# Patient Record
Sex: FEMALE | Race: WHITE | NOT HISPANIC OR LATINO | URBAN - METROPOLITAN AREA
[De-identification: names, ages, dates, MRNs, and addresses within clinical notes are randomized per-mention and may not be internally consistent; named-entity substitution may affect disease eponyms.]

---

## 2017-04-24 ENCOUNTER — IMPORTED ENCOUNTER (OUTPATIENT)
Dept: URBAN - METROPOLITAN AREA CLINIC 1 | Facility: CLINIC | Age: 52
End: 2017-04-24

## 2017-04-24 PROBLEM — H16.143: Noted: 2017-04-24

## 2017-04-24 PROBLEM — H25.13: Noted: 2017-04-24

## 2017-04-24 PROBLEM — H04.123: Noted: 2017-04-24

## 2017-04-24 PROCEDURE — 92012 INTRM OPH EXAM EST PATIENT: CPT

## 2017-04-24 NOTE — PATIENT DISCUSSION
1.  JESSE now w/ PEK OU- Slight progression. The increase of artificial tears OU TID were recommended. Continue Xiidra OU BID. Plugs intact Ll's OU. (Unable to afford Restasis.) 2. Cataract OU: Observe for now without intervention. The patient was advised to contact us if any change or worsening of vision3. Return for an appointment for a 30cc in 6 months with Dr. Jorge Isidro.

## 2017-12-18 ENCOUNTER — IMPORTED ENCOUNTER (OUTPATIENT)
Dept: URBAN - METROPOLITAN AREA CLINIC 1 | Facility: CLINIC | Age: 52
End: 2017-12-18

## 2017-12-18 PROBLEM — H04.123: Noted: 2017-12-18

## 2017-12-18 PROBLEM — H25.13: Noted: 2017-12-18

## 2017-12-18 PROBLEM — H16.143: Noted: 2017-12-18

## 2017-12-18 PROCEDURE — 92014 COMPRE OPH EXAM EST PT 1/>: CPT

## 2017-12-18 PROCEDURE — 92015 DETERMINE REFRACTIVE STATE: CPT

## 2017-12-18 NOTE — PATIENT DISCUSSION
1.  JESSE w/ PEK OU- Stable. Continue Xiidra OU BID. The continuation of artificial tears OU BID were recommended. Plugs intact LL's OU.2. Cataract OU: Observe for now without intervention. The patient was advised to contact us if any change or worsening of vision3. Return for an appointment for a 10 in 6 months with Dr. Hayden Smith.

## 2018-06-18 ENCOUNTER — IMPORTED ENCOUNTER (OUTPATIENT)
Dept: URBAN - METROPOLITAN AREA CLINIC 1 | Facility: CLINIC | Age: 53
End: 2018-06-18

## 2018-06-18 PROBLEM — H04.123: Noted: 2018-06-18

## 2018-06-18 PROBLEM — H25.13: Noted: 2018-06-18

## 2018-06-18 PROBLEM — H16.143: Noted: 2018-06-18

## 2018-06-18 PROCEDURE — 92012 INTRM OPH EXAM EST PATIENT: CPT

## 2018-06-18 NOTE — PATIENT DISCUSSION
1.  JESSE w/ PEK OU-The continuation of artificial tears were recommended. Patient d/c Nghial Shauna secondary to insurance not covering. 2. Cataract OU: Observe for now without intervention. The patient was advised to contact us if any change or worsening of vision3. Return for an appointment for Jan/30 with Dr. Barbara Diaz.

## 2019-01-14 ENCOUNTER — IMPORTED ENCOUNTER (OUTPATIENT)
Dept: URBAN - METROPOLITAN AREA CLINIC 1 | Facility: CLINIC | Age: 54
End: 2019-01-14

## 2019-01-14 PROBLEM — H25.13: Noted: 2019-01-14

## 2019-01-14 PROBLEM — H04.123: Noted: 2019-01-14

## 2019-01-14 PROBLEM — H16.143: Noted: 2019-01-14

## 2019-01-14 PROCEDURE — 92015 DETERMINE REFRACTIVE STATE: CPT

## 2019-01-14 PROCEDURE — 92014 COMPRE OPH EXAM EST PT 1/>: CPT

## 2019-01-14 NOTE — PATIENT DISCUSSION
1.  JESSE w/ PEK OU- (LL plugs in place OU) Recommend ATs QID OU routinely and AT Brad/Gel QHS OU. May use Celluvisc QD-BDI OU. Consider generic Restasis. 2.  Cataract OU: Observe for now without intervention. The patient was advised to contact us if any change or worsening of visionMRX for glasses given and finalized CTL RXReturn for an appointment in 6 months 10 with Dr. Billy Max.

## 2019-08-02 ENCOUNTER — IMPORTED ENCOUNTER (OUTPATIENT)
Dept: URBAN - METROPOLITAN AREA CLINIC 1 | Facility: CLINIC | Age: 54
End: 2019-08-02

## 2019-08-02 PROBLEM — H16.143: Noted: 2019-08-02

## 2019-08-02 PROBLEM — H25.13: Noted: 2019-08-02

## 2019-08-02 PROBLEM — H04.123: Noted: 2019-08-02

## 2019-08-02 PROCEDURE — 92012 INTRM OPH EXAM EST PATIENT: CPT

## 2019-08-02 PROCEDURE — 83861 MICROFLUID ANALY TEARS: CPT

## 2020-01-13 ENCOUNTER — IMPORTED ENCOUNTER (OUTPATIENT)
Dept: URBAN - METROPOLITAN AREA CLINIC 1 | Facility: CLINIC | Age: 55
End: 2020-01-13

## 2020-01-13 PROBLEM — H04.123: Noted: 2020-01-13

## 2020-01-13 PROBLEM — H25.13: Noted: 2020-01-13

## 2020-01-13 PROBLEM — H16.143: Noted: 2020-01-13

## 2020-01-13 PROBLEM — H25.813: Noted: 2020-01-13

## 2020-01-13 PROCEDURE — 92014 COMPRE OPH EXAM EST PT 1/>: CPT

## 2020-01-13 PROCEDURE — 92015 DETERMINE REFRACTIVE STATE: CPT

## 2020-01-13 NOTE — PATIENT DISCUSSION
1.  JESSE w/ PEK OU -- (LL plug in place OU) Tear Lab results 298/292 (08/02/19). Continue Xiidra BID OU (Erx'd). Recommend ATs QID OU routinely and AT Brad QHS OU.2. Cataract OU -- Observe for now without intervention. The patient was advised to contact us if any change or worsening of visionFinalized Glasses MRx and CTL Rx today. Return for an appointment in 6 months for a 10 with Dr. Claudeen Cobble.

## 2021-03-04 ENCOUNTER — IMPORTED ENCOUNTER (OUTPATIENT)
Dept: URBAN - METROPOLITAN AREA CLINIC 1 | Facility: CLINIC | Age: 56
End: 2021-03-04

## 2021-03-04 PROBLEM — H52.223: Noted: 2021-03-04

## 2021-03-04 PROBLEM — H44.23: Noted: 2021-03-04

## 2021-03-04 PROBLEM — H52.4: Noted: 2021-03-04

## 2021-03-04 PROCEDURE — S0621 ROUTINE OPHTHALMOLOGICAL EXA: HCPCS

## 2021-03-04 NOTE — PATIENT DISCUSSION
1. Myopia- Rx was given for correction if indicated and requested. 2. Astigmatism OU3. Presbyopia4. Cataract OU- Observe5. JESSE w/ PEK OU- (LL plug in place OU) Continue Xiidra BID OU. Recommend ATs QID OU routinely and AT Brad QHS OU. CTL Trials to be ordered by Philip Nunez for an appointment in 1 wk CC with Dr. Kari Velásquez. Return for an appointment for Return as scheduled 05/13/2021 with Dr. Ricardo Cohen.

## 2021-04-05 NOTE — PATIENT DISCUSSION
1.  JESSE w/ PEK OU - (LL plug in place OU) Tear Lab results 298/292. Begin Xiidra BID OU (erx) (has used Keisha Solo in the past with good result). Recommend ATs QID OU routinely and AT Rbad QHS OU 2. Cataract OU: Observe for now without intervention. The patient was advised to contact us if any change or worsening of visionReturn for an appointment in January 30/cc with Dr. Mackenzie Villalobos. Dr. Will examining patient.

## 2021-04-08 ENCOUNTER — IMPORTED ENCOUNTER (OUTPATIENT)
Dept: URBAN - METROPOLITAN AREA CLINIC 1 | Facility: CLINIC | Age: 56
End: 2021-04-08

## 2021-04-08 NOTE — PATIENT DISCUSSION
1.  CC today - Good fit [employer] OU. [address] and given to pt. Return for an appointment in 1 year 40/cc with Dr. Mikala Elena. Return for an appointment for Return as scheduled with Dr. Samira Wyatt.

## 2021-05-13 ENCOUNTER — IMPORTED ENCOUNTER (OUTPATIENT)
Dept: URBAN - METROPOLITAN AREA CLINIC 1 | Facility: CLINIC | Age: 56
End: 2021-05-13

## 2021-05-13 PROBLEM — H16.143: Noted: 2021-05-13

## 2021-05-13 PROBLEM — H25.813: Noted: 2021-05-13

## 2021-05-13 PROBLEM — H04.123: Noted: 2021-05-13

## 2021-05-13 PROCEDURE — 99214 OFFICE O/P EST MOD 30 MIN: CPT

## 2021-05-13 NOTE — PATIENT DISCUSSION
1.  JESSE w/ PEK OU -- Recommend compliance with ATs QID OU routinely. Cont Xiidra BID OU. 2.  Cataract OU -- Observe for now without intervention. The patient was advised to contact us if any change or worsening of vision3. Pinguecula OS -- Observe. Sunglasses when exposed to UV is recommended. Return for an appointment in 6 months for a 10 with Dr. Edd Guidry.

## 2021-11-15 ENCOUNTER — IMPORTED ENCOUNTER (OUTPATIENT)
Dept: URBAN - METROPOLITAN AREA CLINIC 1 | Facility: CLINIC | Age: 56
End: 2021-11-15

## 2021-11-15 PROBLEM — H16.143: Noted: 2021-11-15

## 2021-11-15 PROBLEM — H04.123: Noted: 2021-11-15

## 2021-11-15 PROCEDURE — 99213 OFFICE O/P EST LOW 20 MIN: CPT

## 2021-11-15 NOTE — PATIENT DISCUSSION
1.  JESSE w/ PEK OU (LL Plug in place OU) -- Cont Xiidra BID OU & ATs QID OU routinely. 2.  Cataract OU -- Observe for now without intervention. The patient was advised to contact us if any change or worsening of vision3. Pinguecula OS -- Observe. Sunglasses when exposed to UV is recommended. 4.  CTL wear Return for an appointment as scheduled with Dr. Danuta Domínguez. Return for an appointment in 6 months 27 with Dr. Nallely Jane.

## 2022-04-02 ASSESSMENT — TONOMETRY
OD_IOP_MMHG: 15
OD_IOP_MMHG: 14
OS_IOP_MMHG: 16
OS_IOP_MMHG: 12
OS_IOP_MMHG: 16
OS_IOP_MMHG: 15
OD_IOP_MMHG: 12
OS_IOP_MMHG: 14
OS_IOP_MMHG: 14
OD_IOP_MMHG: 15
OS_IOP_MMHG: 14
OD_IOP_MMHG: 16
OS_IOP_MMHG: 14
OD_IOP_MMHG: 15
OD_IOP_MMHG: 16
OD_IOP_MMHG: 14
OD_IOP_MMHG: 14
OS_IOP_MMHG: 15

## 2022-04-02 ASSESSMENT — KERATOMETRY
OD_K2POWER_DIOPTERS: 45.00
OS_K1POWER_DIOPTERS: 44.50
OD_K1POWER_DIOPTERS: 44.00
OS_K2POWER_DIOPTERS: 44.75
OD_AXISANGLE2_DEGREES: 073
OD_AXISANGLE2_DEGREES: 073
OD_K2POWER_DIOPTERS: 45.50
OS_AXISANGLE_DEGREES: 013
OS_AXISANGLE_DEGREES: 004
OS_AXISANGLE2_DEGREES: 103
OD_K1POWER_DIOPTERS: 44.25
OS_K1POWER_DIOPTERS: 44.50
OD_AXISANGLE_DEGREES: 163
OD_AXISANGLE_DEGREES: 163
OS_K2POWER_DIOPTERS: 45.00
OS_AXISANGLE2_DEGREES: 094

## 2022-04-02 ASSESSMENT — VISUAL ACUITY
OS_SC: 20/20
OD_SC: 20/20
OD_GLARE: 20/40
OS_CC: J1+
OS_SC: 20/20
OD_SC: 20/20
OD_SC: 20/20
OS_SC: 20/20
OS_SC: 20/20
OD_SC: 20/20
OS_CC: J1
OD_SC: 20/20
OS_SC: 20/20
OS_CC: J1
OD_SC: 20/20
OD_SC: 20/20
OS_SC: 20/20
OS_SC: 20/20
OD_CC: J1
OS_SC: 20/20
OD_SC: 20/20
OD_SC: 20/20
OD_CC: J1
OD_SC: 20/20
OD_CC: J1+
OS_SC: 20/20
OS_GLARE: 20/40
OS_SC: 20/20

## 2022-04-25 ENCOUNTER — COMPREHENSIVE EXAM (OUTPATIENT)
Dept: URBAN - METROPOLITAN AREA CLINIC 1 | Facility: CLINIC | Age: 57
End: 2022-04-25

## 2022-04-25 DIAGNOSIS — H52.223: ICD-10-CM

## 2022-04-25 DIAGNOSIS — H52.13: ICD-10-CM

## 2022-04-25 PROCEDURE — S0621 ROUTINE OPHTHALMOLOGICAL EXA: HCPCS

## 2022-04-25 PROCEDURE — 92310 CONTACT LENS FITTING OU: CPT

## 2022-04-25 ASSESSMENT — VISUAL ACUITY
OS_CC: 20/20 -1
OD_CC: 20/20 -1
OS_CC: J1+
OD_CC: J1+

## 2022-04-25 ASSESSMENT — TONOMETRY
OS_IOP_MMHG: 14
OD_IOP_MMHG: 14

## 2022-04-25 NOTE — PATIENT DISCUSSION
Patient given Rx for glasses and trials for MF contacts (Ultra Toric). Advised patient to return for CC, if patient tries lenses for a little bit before next appointment and does not like them, per RBF finalized old CTL Rx as back up plan. OK to release if patient calls and dislikes MF CTL. Previous lenses (Precision 1 Toric/Precision 1).

## 2022-07-11 ENCOUNTER — COMPREHENSIVE EXAM (OUTPATIENT)
Dept: URBAN - METROPOLITAN AREA CLINIC 1 | Facility: CLINIC | Age: 57
End: 2022-07-11

## 2022-07-11 DIAGNOSIS — H04.123: ICD-10-CM

## 2022-07-11 DIAGNOSIS — H11.152: ICD-10-CM

## 2022-07-11 DIAGNOSIS — H25.13: ICD-10-CM

## 2022-07-11 DIAGNOSIS — H16.143: ICD-10-CM

## 2022-07-11 PROCEDURE — 92014 COMPRE OPH EXAM EST PT 1/>: CPT

## 2022-07-11 ASSESSMENT — TONOMETRY
OD_IOP_MMHG: 14
OS_IOP_MMHG: 14

## 2022-07-11 ASSESSMENT — VISUAL ACUITY
OD_CC: 20/20
OS_CC: 20/20

## 2023-01-23 ENCOUNTER — FOLLOW UP (OUTPATIENT)
Dept: URBAN - METROPOLITAN AREA CLINIC 1 | Facility: CLINIC | Age: 58
End: 2023-01-23

## 2023-01-23 DIAGNOSIS — H16.143: ICD-10-CM

## 2023-01-23 DIAGNOSIS — H04.123: ICD-10-CM

## 2023-01-23 PROCEDURE — 92012 INTRM OPH EXAM EST PATIENT: CPT

## 2023-01-23 ASSESSMENT — TONOMETRY
OS_IOP_MMHG: 13
OD_IOP_MMHG: 13

## 2023-01-23 ASSESSMENT — VISUAL ACUITY
OD_CC: 20/20
OS_CC: J1
OD_CC: J1
OS_CC: 20/20

## 2023-01-23 NOTE — PATIENT DISCUSSION
Advised the patient to continue the use of Xiidra BID OU and increase the use of AT's to BID-TID OU.

## 2024-01-05 ENCOUNTER — HOSPITAL ENCOUNTER (OUTPATIENT)
Facility: HOSPITAL | Age: 59
Setting detail: RECURRING SERIES
Discharge: HOME | End: 2024-01-08
Payer: COMMERCIAL

## 2024-01-05 PROCEDURE — 97161 PT EVAL LOW COMPLEX 20 MIN: CPT

## 2024-01-05 NOTE — PROGRESS NOTES
PT DAILY TREATMENT NOTE/KNEE EVAL       Patient Name: Meenakshi Parnell    Date: 2024    : 1965  Insurance: Payor: EMILE / Plan: EMILE POS / Product Type: *No Product type* /      Patient  verified yes     Visit #   Current / Total 1 8   Time   In / Out 9:05 9:42   Pain   In / Out 0 0   Subjective Functional Status/Changes: See eval     Treatment Area: Right knee pain [M25.561]    SUBJECTIVE  Pain Level (0-10 scale): 0/10 pain currently, worst pain 5/10.   []constant []intermittent []improving []worsening []no change since onset  Any medication changes, allergies to medications, adverse drug reactions, diagnosis change, or new procedure performed?: [x] No    [] Yes (see summary sheet for update)    Subjective functional status/changes:     ~6 months ago insidious without known KHRIS. Pain described stabbing pain, along inside of R knee. Walking doesn't bother it. Still walks in neighborhood. Bending it a certain way can increase pain (crossing R LE over L knee), sometimes going upstairs can cause pain. Walked 51 miles over 2 weeks in [place] which may have worsened pain.   Reports more stiffness first thing in the morning, making it difficulty to straighten knee out. Hasn't noticed any swelling, denies locking/catching/giving way  Fell in October down carpet steps. Didn't think did anything when she fell and denies pain with it.  Scheduled to see MD in February.   Aggravating: crossing leg, going upstairs intermittently  Alleviating: CBD oil, straightening out knee, meloxicam.   Only takes meloxicam every so often.   PLOF: Walking, works out with   Mechanism of Injury: insidious, no known KHRIS  Imaging: x-ray \"showed arthritis\".   Current symptoms/Complaints: Sharp, stabbing pain at medial joint line  Previous Treatment/Compliance: No prior knee injury. Meloxicam.   PMHx/Surgical Hx: Back pain, breast cancer in  in remission.   Living Situation: bedroom on second floor. 
MD    ** Signature, Date and Time must be completed for valid certification **  Please sign and fax to [facility]. Thank you

## 2024-01-08 ENCOUNTER — APPOINTMENT (OUTPATIENT)
Facility: HOSPITAL | Age: 59
End: 2024-01-08
Payer: COMMERCIAL

## 2024-01-09 ENCOUNTER — HOSPITAL ENCOUNTER (OUTPATIENT)
Facility: HOSPITAL | Age: 59
Setting detail: RECURRING SERIES
Discharge: HOME | End: 2024-01-12
Payer: COMMERCIAL

## 2024-01-09 PROCEDURE — 97112 NEUROMUSCULAR REEDUCATION: CPT

## 2024-01-09 PROCEDURE — 97110 THERAPEUTIC EXERCISES: CPT

## 2024-01-09 PROCEDURE — 97530 THERAPEUTIC ACTIVITIES: CPT

## 2024-01-09 NOTE — PROGRESS NOTES
PHYSICAL / OCCUPATIONAL THERAPY - DAILY TREATMENT NOTE    Patient Name: Meenakshi Parnell    Date: 2024    : 1965  Insurance: Payor: EMILE / Plan: MARISABENJAMIN POS / Product Type: *No Product type* /      Patient  verified Yes     Visit #   Current / Total 2 8   Time   In / Out 3:41 4:38   Pain   In / Out 0 0-1   Subjective Functional Status/Changes: Pt reports improvement following the tape. Had some pain going down the stairs today. With tape, had improved pain with stairs.     TREATMENT AREA =  Right knee pain [M25.561]    OBJECTIVE    Ice (UNBILLED):  location/position: supine, R knee     Min Rationale   10 decrease inflammation and decrease pain to improve patient's ability to progress to PLOF and address remaining functional goals.     Skin assessment post-treatment:   Intact     Therapeutic Procedures:    27128 Therapeutic Exercise (timed):  increase ROM, strength, coordination, balance, and proprioception to improve patient's ability to progress to PLOF and address remaining functional goals.   Tx Min Billable or 1:1 Min   (if diff from Tx Min) Details:   15 10 See flow sheet as applicable     70565 Neuromuscular Re-Education (timed):  improve balance, coordination, kinesthetic sense, posture, core stability and proprioception to improve patient's ability to develop conscious control of individual muscles and awareness of position of extremities in order to progress to PLOF and address remaining functional goals.   Tx Min Billable or 1:1 Min   (if diff from Tx Min) Details:   15  See flow sheet as applicable     16827 Therapeutic Activity (timed):  use of dynamic activities replicating functional movements to increase ROM, strength, coordination, balance, and proprioception in order to improve patient's ability to progress to PLOF and address remaining functional goals.   Tx Min Billable or 1:1 Min   (if diff from Tx Min) Details:   17  See flow sheet as applicable       47 42 [facility] Totals Reminder:

## 2024-01-11 ENCOUNTER — HOSPITAL ENCOUNTER (OUTPATIENT)
Facility: HOSPITAL | Age: 59
Setting detail: RECURRING SERIES
Discharge: HOME | End: 2024-01-14
Payer: COMMERCIAL

## 2024-01-11 PROCEDURE — 97530 THERAPEUTIC ACTIVITIES: CPT

## 2024-01-11 PROCEDURE — 97112 NEUROMUSCULAR REEDUCATION: CPT

## 2024-01-11 PROCEDURE — 97110 THERAPEUTIC EXERCISES: CPT

## 2024-01-11 NOTE — PROGRESS NOTES
PHYSICAL / OCCUPATIONAL THERAPY - DAILY TREATMENT NOTE    Patient Name: Meenakshi Parnell    Date: 2024    : 1965  Insurance: Payor: EMILE / Plan: EMILE POS / Product Type: *No Product type* /      Patient  verified Yes     Visit #   Current / Total 3 8   Time   In / Out 1:40 2:37   Pain   In / Out 2 0   Subjective Functional Status/Changes: Reports has been taking down [place] decorations and have had to go up/down stairs multiple times.      TREATMENT AREA =  Right knee pain [M25.561]    OBJECTIVE    Ice (UNBILLED):  location/position: L knee, supine     Min Rationale   10 decrease inflammation and decrease pain to improve patient's ability to progress to PLOF and address remaining functional goals.     Skin assessment post-treatment:   Intact     Therapeutic Procedures:    16365 Therapeutic Exercise (timed):  increase ROM, strength, coordination, balance, and proprioception to improve patient's ability to progress to PLOF and address remaining functional goals.   Tx Min Billable or 1:1 Min   (if diff from Tx Min) Details:   15  See flow sheet as applicable     35064 Neuromuscular Re-Education (timed):  improve balance, coordination, kinesthetic sense, posture, core stability and proprioception to improve patient's ability to develop conscious control of individual muscles and awareness of position of extremities in order to progress to PLOF and address remaining functional goals.   Tx Min Billable or 1:1 Min   (if diff from Tx Min) Details:   15  See flow sheet as applicable     15344 Therapeutic Activity (timed):  use of dynamic activities replicating functional movements to increase ROM, strength, coordination, balance, and proprioception in order to improve patient's ability to progress to PLOF and address remaining functional goals.   Tx Min Billable or 1:1 Min   (if diff from Tx Min) Details:   17  See flow sheet as applicable       47  [facility] Totals Reminder: bill using total billable min

## 2024-01-15 ENCOUNTER — HOSPITAL ENCOUNTER (OUTPATIENT)
Facility: HOSPITAL | Age: 59
Setting detail: RECURRING SERIES
Discharge: HOME | End: 2024-01-18
Payer: COMMERCIAL

## 2024-01-15 PROCEDURE — 97110 THERAPEUTIC EXERCISES: CPT

## 2024-01-15 PROCEDURE — 97530 THERAPEUTIC ACTIVITIES: CPT

## 2024-01-15 PROCEDURE — 97112 NEUROMUSCULAR REEDUCATION: CPT

## 2024-01-15 NOTE — PROGRESS NOTES
PHYSICAL / OCCUPATIONAL THERAPY - DAILY TREATMENT NOTE    Patient Name: Meenakshi Parnell    Date: 1/15/2024    : 1965  Insurance: Payor: EMILE / Plan: EMILE POS / Product Type: *No Product type* /      Patient  verified Yes     Visit #   Current / Total 4 8   Time   In / Out 10:20 11:14   Pain   In / Out 0 0   Subjective Functional Status/Changes: Has been icing knee more and thinks it's been helping. Reports not much pain when putting away [place] decorations this weekend.      TREATMENT AREA =  Right knee pain [M25.561]    OBJECTIVE    Ice (UNBILLED):  location/position: supine, R knee     Min Rationale   10 decrease inflammation and decrease pain to improve patient's ability to progress to PLOF and address remaining functional goals.     Skin assessment post-treatment:   Intact     Therapeutic Procedures:    99730 Therapeutic Exercise (timed):  increase ROM, strength, coordination, balance, and proprioception to improve patient's ability to progress to PLOF and address remaining functional goals.   Tx Min Billable or 1:1 Min   (if diff from Tx Min) Details:   15 10 See flow sheet as applicable     93705 Neuromuscular Re-Education (timed):  improve balance, coordination, kinesthetic sense, posture, core stability and proprioception to improve patient's ability to develop conscious control of individual muscles and awareness of position of extremities in order to progress to PLOF and address remaining functional goals.   Tx Min Billable or 1:1 Min   (if diff from Tx Min) Details:   15  See flow sheet as applicable     93842 Therapeutic Activity (timed):  use of dynamic activities replicating functional movements to increase ROM, strength, coordination, balance, and proprioception in order to improve patient's ability to progress to PLOF and address remaining functional goals.   Tx Min Billable or 1:1 Min   (if diff from Tx Min) Details:   14  See flow sheet as applicable       44 39  [facility] Totals

## 2024-01-17 ENCOUNTER — HOSPITAL ENCOUNTER (OUTPATIENT)
Facility: HOSPITAL | Age: 59
Setting detail: RECURRING SERIES
Discharge: HOME | End: 2024-01-20
Payer: COMMERCIAL

## 2024-01-17 PROCEDURE — 97112 NEUROMUSCULAR REEDUCATION: CPT

## 2024-01-17 PROCEDURE — 97530 THERAPEUTIC ACTIVITIES: CPT

## 2024-01-17 PROCEDURE — 97110 THERAPEUTIC EXERCISES: CPT

## 2024-01-17 NOTE — PROGRESS NOTES
PHYSICAL / OCCUPATIONAL THERAPY - DAILY TREATMENT NOTE    Patient Name: Meenakshi Parnell    Date: 2024    : 1965  Insurance: Payor: EMILE / Plan: EMILE POS / Product Type: *No Product type* /      Patient  verified Yes     Visit #   Current / Total 5 8   Time   In / Out 9:05 10:00   Pain   In / Out 0 0   Subjective Functional Status/Changes: Reports no pain while working with . Reports only has pain when sleeping on side with knee bent and goes to straighten her knee.     TREATMENT AREA =  Right knee pain [M25.561]    OBJECTIVE    Ice (UNBILLED):  location/position: supine, R knee     Min Rationale   10 decrease inflammation and decrease pain to improve patient's ability to progress to PLOF and address remaining functional goals.     Skin assessment post-treatment:   Intact     Therapeutic Procedures:    89171 Therapeutic Exercise (timed):  increase ROM, strength, coordination, balance, and proprioception to improve patient's ability to progress to PLOF and address remaining functional goals.   Tx Min Billable or 1:1 Min   (if diff from Tx Min) Details:   15  See flow sheet as applicable     67451 Neuromuscular Re-Education (timed):  improve balance, coordination, kinesthetic sense, posture, core stability and proprioception to improve patient's ability to develop conscious control of individual muscles and awareness of position of extremities in order to progress to PLOF and address remaining functional goals.   Tx Min Billable or 1:1 Min   (if diff from Tx Min) Details:   15  See flow sheet as applicable     81950 Therapeutic Activity (timed):  use of dynamic activities replicating functional movements to increase ROM, strength, coordination, balance, and proprioception in order to improve patient's ability to progress to PLOF and address remaining functional goals.   Tx Min Billable or 1:1 Min   (if diff from Tx Min) Details:   15  See flow sheet as applicable       45  [facility] Totals

## 2024-01-23 ENCOUNTER — HOSPITAL ENCOUNTER (OUTPATIENT)
Facility: HOSPITAL | Age: 59
Setting detail: RECURRING SERIES
Discharge: HOME | End: 2024-01-26
Payer: COMMERCIAL

## 2024-01-23 PROCEDURE — 97110 THERAPEUTIC EXERCISES: CPT

## 2024-01-23 PROCEDURE — 97530 THERAPEUTIC ACTIVITIES: CPT

## 2024-01-23 PROCEDURE — 97112 NEUROMUSCULAR REEDUCATION: CPT

## 2024-01-23 NOTE — PROGRESS NOTES
(example: do not include dry needle or estim unattended, both untimed codes, in totals to left)  8-22 min = 1 unit; 23-37 min = 2 units; 38-52 min = 3 units; 53-67 min = 4 units; 68-82 min = 5 units   Total Total     [x]  Patient Education billed concurrently with other procedures   [x] Review HEP    [] Progressed/Changed HEP, detail:    [] Other detail:       Objective Information/Functional Measures/Assessment    Performed medial patellar glide taping to determine if aids in pain relief during ADLs. Pt able to progress height of step ups and step downs without increased knee pain. Decreased endurance noted with step downs. Able to progress total gym squats to perform eccentrically to increase quad strength, with pt able to complete without increased pain; decreased eccentric control particularly with increased repetition. Continues to have increased difficulty with triple threat bridge progression, with increased instability and decreased endurance. Pt reports no pain following session.     Patient will continue to benefit from skilled PT / OT services to modify and progress therapeutic interventions, analyze and address functional mobility deficits, analyze and address ROM deficits, analyze and address strength deficits, analyze and address soft tissue restrictions, analyze and cue for proper movement patterns, analyze and modify for postural abnormalities, and analyze and address imbalance/dizziness to address functional deficits and attain remaining goals.    Progress toward goals / Updated goals:  []  See Progress Note/Recertification    Short Term Goals: To be accomplished in 4 weeks  Pt will be independent with initial HEP to maximize benefit of PT.--reports compliance with HEP  Pt will improve hamstring flexibility within 5 degrees of contralateral side to decrease tension around the knee.--progressing  Pt will demonstrate a GROC score > or = 2 to demonstrate objective improvement in self-reported function.

## 2024-01-30 ENCOUNTER — APPOINTMENT (OUTPATIENT)
Facility: HOSPITAL | Age: 59
End: 2024-01-30
Payer: COMMERCIAL

## 2024-02-29 ENCOUNTER — COMPREHENSIVE EXAM (OUTPATIENT)
Dept: URBAN - METROPOLITAN AREA CLINIC 1 | Facility: CLINIC | Age: 59
End: 2024-02-29

## 2024-02-29 DIAGNOSIS — H16.143: ICD-10-CM

## 2024-02-29 DIAGNOSIS — H11.152: ICD-10-CM

## 2024-02-29 DIAGNOSIS — H04.123: ICD-10-CM

## 2024-02-29 DIAGNOSIS — H25.13: ICD-10-CM

## 2024-02-29 PROCEDURE — 92014 COMPRE OPH EXAM EST PT 1/>: CPT

## 2024-02-29 ASSESSMENT — VISUAL ACUITY
OD_CC: 20/20
OS_CC: J1+
OD_CC: J1+
OS_CC: 20/20-1

## 2024-02-29 ASSESSMENT — TONOMETRY
OS_IOP_MMHG: 14
OD_IOP_MMHG: 14

## 2024-03-07 ENCOUNTER — HOSPITAL ENCOUNTER (OUTPATIENT)
Facility: HOSPITAL | Age: 59
Setting detail: RECURRING SERIES
Discharge: HOME | End: 2024-03-10
Payer: COMMERCIAL

## 2024-03-07 PROCEDURE — 97161 PT EVAL LOW COMPLEX 20 MIN: CPT

## 2024-03-07 NOTE — PROGRESS NOTES
PT DAILY TREATMENT NOTE/LUMBAR EVAL     Patient Name: Meenakshi Parnell    Date: 3/7/2024    : 1965  Insurance: Payor: EMILE / Plan: EMILE POS / Product Type: *No Product type* /      Patient  verified yes     Visit #   Current / Total 1 8   Time   In / Out 3:45 4:20   Pain   In / Out 2 2   Subjective Functional Status/Changes: See eval       Treatment Area: Other low back pain [M54.59]  SUBJECTIVE  Pain Level (0-10 scale): Worst pain over last week 2/10,   []constant []intermittent []improving []worsening []no change since onset  Any medication changes, allergies to medications, adverse drug reactions, diagnosis change, or new procedure performed?: [x] No    [] Yes (see summary sheet for update)  Subjective functional status/changes:     Back doesn't feel as bad as November. ~November began having pain, couldn't bend down without pain. Thinks may have been exacerbated from prolonged. MD thinks she has pinched nerve. Felt like a catching in L LB and then down back of R leg. Reports not debilitating, doesn't limit her activities, doesn't wake her up out bed.   Aggravating: bending down, transition from sit<>stand, walking, getting out of bed  Alleviating: heating pad, sitting, advil.  Last night got massage through low back  PLOF: Works out with , recently got back into it after February travel.   Mechanism of Injury: Insidious   Current symptoms/Complaints: Sharp/stabbing pain in low back. Achy currently.   Previous Treatment/Compliance: NA  PMHx/Surgical Hx: Back pain, breast cancer in  in remission.    Pt Goals: Get back stronger.     Meenakshi Parnell is a 58 y.o.  yo female who presents to PT for low back pain, reporting insidious onset ~2023. Pt denies any KHRIS, but thinks it may have been exacerbated by increased walking when she went on vacation in [place].  Pt rates pain as 8/10 max, 1/10 at best, 2/10 today, located primarily L>R low back.  Pt reports improvement in pain

## 2024-03-08 NOTE — PROGRESS NOTES
[facility]   [address], [address], [place] [address] Ph: [phone] Fx: [phone]  Plan of Care / Statement of Necessity for Physical Therapy Services     Patient Name: Meenakshi Parnell : 1965   Medical   Diagnosis: Other low back pain [M54.59] Treatment Diagnosis:   M54.59  OTHER LOWER BACK PAIN    Onset Date: ~2023     Referral Source: Omer Nolasco DO Start of Care (SOC): 3/7/2024   Prior Hospitalization: See medical history Provider #: [id number]   Prior Level of Function: Recently returned to    Comorbidities: Back pain, breast cancer in  in remission, osteoporosis     Assessment / raymond information:  Meenakshi Parnell is a 58 y.o.  yo female who presents to PT for low back pain, reporting insidious onset ~2023. Pt denies any KHRIS, but thinks it may have been exacerbated by increased walking when she went on vacation in [place].  Pt rates pain as 8/10 max, 1/10 at best, 2/10 today, located primarily L>R low back.  Pt reports improvement in pain recently and wishes to improve strength of back in PT. Aggravating factors include bending down, transitioning from sit<>stand, walking, and getting out of bed. Alleviating factors include heating pad, advil, and sitting. Pt would benefit from skilled PT to address ROM, strength, and flexibility to promote return to active PLOF. Pertinent exam findings include:      Posture: increased R>L knee valgus   Kyphosis:        [x] Increased   [] Decreased   []  WNL  Lordosis:         [] Increased   [x] Decreased   [] WNL     Active Movements: [] N/A   [] Too acute   [] Other:  ROM % AROM Comments:pain, area   Forward flexion 40-60 100 Pain through R posterior hip   Extension 20-30 50 Pain ~L SIJ   SB right 20-30 75     SB left 20-30 50 Pain ~L SIJ/low back   Rotation right 5-10 100     Rotation left 5-10 100 Pain ~L SIJ/low back      Palpation  TTP R gluteal musculature,

## 2024-03-12 ENCOUNTER — HOSPITAL ENCOUNTER (OUTPATIENT)
Facility: HOSPITAL | Age: 59
Setting detail: RECURRING SERIES
Discharge: HOME | End: 2024-03-15
Payer: COMMERCIAL

## 2024-03-12 PROCEDURE — 97110 THERAPEUTIC EXERCISES: CPT

## 2024-03-12 PROCEDURE — 97140 MANUAL THERAPY 1/> REGIONS: CPT

## 2024-03-12 PROCEDURE — 97112 NEUROMUSCULAR REEDUCATION: CPT

## 2024-03-12 NOTE — PROGRESS NOTES
independent with final HEP to promote self-management of condition.  Pt will report no more than 1/10 back pain when exercising with  to promote return to active PLOF.     Next PN/ RC due 4/6/2024    PLAN  - Continue Plan of Care  - Upgrade activities as tolerated    Trixie Faria, PT    3/12/2024    12:26 PM    Future Appointments   Date Time Provider Department Center   3/14/2024 11:40 AM Trixie Faria, PT [facility]   3/18/2024  3:40 PM Trixie Faria, PT [facility]   3/20/2024 11:00 AM Trixie Faria, PT [facility]   3/25/2024 12:20 PM Trixie Faria, PT [facility]   4/2/2024  9:40 AM Trixie Faria, PT [facility]

## 2024-03-14 ENCOUNTER — APPOINTMENT (OUTPATIENT)
Facility: HOSPITAL | Age: 59
End: 2024-03-14
Payer: COMMERCIAL

## 2024-03-18 ENCOUNTER — HOSPITAL ENCOUNTER (OUTPATIENT)
Facility: HOSPITAL | Age: 59
Setting detail: RECURRING SERIES
Discharge: HOME | End: 2024-03-21
Payer: COMMERCIAL

## 2024-03-18 PROCEDURE — 97530 THERAPEUTIC ACTIVITIES: CPT

## 2024-03-18 PROCEDURE — 97112 NEUROMUSCULAR REEDUCATION: CPT

## 2024-03-18 PROCEDURE — 97110 THERAPEUTIC EXERCISES: CPT

## 2024-03-18 NOTE — PROGRESS NOTES
activities as tolerated    Trixie Faria, [place]    3/18/2024    3:43 PM    Future Appointments   Date Time Provider Department Center   [facility]2024 [facility]   3/25/2024 12:20 PM Trixie Faria, PT [facility]   4/2/2024  9:40 AM Trixie Faria, PT [facility]

## 2024-03-20 ENCOUNTER — APPOINTMENT (OUTPATIENT)
Facility: HOSPITAL | Age: 59
End: 2024-03-20
Payer: COMMERCIAL

## 2024-03-22 ENCOUNTER — COMPREHENSIVE EXAM (OUTPATIENT)
Dept: URBAN - METROPOLITAN AREA CLINIC 1 | Facility: CLINIC | Age: 59
End: 2024-03-22

## 2024-03-22 DIAGNOSIS — H52.4: ICD-10-CM

## 2024-03-22 DIAGNOSIS — H52.223: ICD-10-CM

## 2024-03-22 DIAGNOSIS — H52.13: ICD-10-CM

## 2024-03-22 DIAGNOSIS — Z46.0: ICD-10-CM

## 2024-03-22 DIAGNOSIS — Z01.00: ICD-10-CM

## 2024-03-22 PROCEDURE — 92015 DETERMINE REFRACTIVE STATE: CPT

## 2024-03-22 PROCEDURE — 92014 COMPRE OPH EXAM EST PT 1/>: CPT

## 2024-03-22 PROCEDURE — 92310-3 LEVEL 3 CONTACT LENS MANAGEMENT

## 2024-03-22 ASSESSMENT — VISUAL ACUITY
OS_CC: J1+
OS_CC: 20/20-1
OD_CC: J1+
OD_CC: 20/20

## 2024-03-25 ENCOUNTER — HOSPITAL ENCOUNTER (OUTPATIENT)
Facility: HOSPITAL | Age: 59
Setting detail: RECURRING SERIES
Discharge: HOME | End: 2024-03-28
Payer: COMMERCIAL

## 2024-03-25 PROCEDURE — 97530 THERAPEUTIC ACTIVITIES: CPT

## 2024-03-25 PROCEDURE — 97110 THERAPEUTIC EXERCISES: CPT

## 2024-03-25 PROCEDURE — 97112 NEUROMUSCULAR REEDUCATION: CPT

## 2024-03-25 NOTE — PROGRESS NOTES
PHYSICAL / OCCUPATIONAL THERAPY - DAILY TREATMENT NOTE    Patient Name: Meenakshi Parnell    Date: 3/25/2024    : 1965  Insurance: Payor: EMILE / Plan: EMILE POS / Product Type: *No Product type* /      Patient  verified Yes     Visit #   Current / Total 4 8   Time   In / Out 12:25 1:13   Pain   In / Out 1-2 0   Subjective Functional Status/Changes: Reports back feels pretty good. No real significant increase in pain.      TREATMENT AREA =  Other low back pain [M54.59]    OBJECTIVE    Heat (UNBILLED):  location/position: supine, L/s .    Min Rationale   10 decrease pain and increase tissue extensibility to improve patient's ability to progress to PLOF and address remaining functional goals.     Skin assessment post-treatment:   Intact     Therapeutic Procedures:    02252 Therapeutic Exercise (timed):  increase ROM, strength, coordination, balance, and proprioception to improve patient's ability to progress to PLOF and address remaining functional goals.   Tx Min Billable or 1:1 Min   (if diff from Tx Min) Details:   13  See flow sheet as applicable     97097 Neuromuscular Re-Education (timed):  improve balance, coordination, kinesthetic sense, posture, core stability and proprioception to improve patient's ability to develop conscious control of individual muscles and awareness of position of extremities in order to progress to PLOF and address remaining functional goals.   Tx Min Billable or 1:1 Min   (if diff from Tx Min) Details:   15  See flow sheet as applicable     90274 Therapeutic Activity (timed):  use of dynamic activities replicating functional movements to increase ROM, strength, coordination, balance, and proprioception in order to improve patient's ability to progress to PLOF and address remaining functional goals.   Tx Min Billable or 1:1 Min   (if diff from Tx Min) Details:   10  See flow sheet as applicable       38  [facility] Totals Reminder: bill using total billable min of TIMED

## 2025-03-26 ENCOUNTER — COMPREHENSIVE EXAM (OUTPATIENT)
Age: 60
End: 2025-03-26

## 2025-03-26 DIAGNOSIS — Z46.0: ICD-10-CM

## 2025-03-26 DIAGNOSIS — H52.4: ICD-10-CM

## 2025-03-26 DIAGNOSIS — H52.13: ICD-10-CM

## 2025-03-26 DIAGNOSIS — H52.223: ICD-10-CM

## 2025-03-26 DIAGNOSIS — Z01.00: ICD-10-CM

## 2025-03-26 PROCEDURE — 92014 COMPRE OPH EXAM EST PT 1/>: CPT

## 2025-03-26 PROCEDURE — 92015 DETERMINE REFRACTIVE STATE: CPT

## 2025-03-26 PROCEDURE — 92310-3 LEVEL 3 SOFT LENS UPDATE
